# Patient Record
Sex: MALE | Race: BLACK OR AFRICAN AMERICAN
[De-identification: names, ages, dates, MRNs, and addresses within clinical notes are randomized per-mention and may not be internally consistent; named-entity substitution may affect disease eponyms.]

---

## 2023-02-28 ENCOUNTER — DASHBOARD ENCOUNTERS (OUTPATIENT)
Age: 33
End: 2023-02-28

## 2023-02-28 ENCOUNTER — WEB ENCOUNTER (OUTPATIENT)
Dept: URBAN - METROPOLITAN AREA CLINIC 92 | Facility: CLINIC | Age: 33
End: 2023-02-28

## 2023-02-28 ENCOUNTER — OFFICE VISIT (OUTPATIENT)
Dept: URBAN - METROPOLITAN AREA CLINIC 92 | Facility: CLINIC | Age: 33
End: 2023-02-28
Payer: COMMERCIAL

## 2023-02-28 VITALS
WEIGHT: 166.6 LBS | DIASTOLIC BLOOD PRESSURE: 81 MMHG | HEART RATE: 84 BPM | SYSTOLIC BLOOD PRESSURE: 129 MMHG | BODY MASS INDEX: 23.85 KG/M2 | HEIGHT: 70 IN | TEMPERATURE: 97.2 F

## 2023-02-28 DIAGNOSIS — R19.7 DIARRHEA, UNSPECIFIED TYPE: ICD-10-CM

## 2023-02-28 DIAGNOSIS — K62.5 RECTAL BLEEDING: ICD-10-CM

## 2023-02-28 PROCEDURE — 99203 OFFICE O/P NEW LOW 30 MIN: CPT

## 2023-02-28 NOTE — PHYSICAL EXAM GASTROINTESTINAL
Abdomen,  soft, lower abd tenderness, nondistended,  no guarding or rigidity,  no masses palpable,  normal bowel sounds Liver and Spleen,no hepatosplenomegaly

## 2023-03-02 ENCOUNTER — TELEPHONE ENCOUNTER (OUTPATIENT)
Dept: URBAN - METROPOLITAN AREA CLINIC 92 | Facility: CLINIC | Age: 33
End: 2023-03-02

## 2023-03-02 RX ORDER — CIPROFLOXACIN 500 MG/1
1 TABLET TABLET, FILM COATED ORAL TWICE A DAY
Qty: 6 TABLET | Refills: 0 | OUTPATIENT
Start: 2023-03-02 | End: 2023-03-05

## 2023-03-23 ENCOUNTER — OFFICE VISIT (OUTPATIENT)
Dept: URBAN - METROPOLITAN AREA CLINIC 92 | Facility: CLINIC | Age: 33
End: 2023-03-23

## 2024-05-23 NOTE — HPI-TODAY'S VISIT:
32-year-old male patient presents today with complaints of abdominal pain, diarrhea and blood in the stool for the past 3 days.  He was seen today at [facility] who obtained labs which were reviewed on his phone today.  CBC, CMP were negative.  No CT scan or stool studies were done Patient states that about 3 days ago he started noticing increased diarrhea along with blood.  He is having 3-4 bowel movements daily all loose.  Today he had a little bit more formed stool but this was still loose he has tried Imodium that does not work for symptoms. Abd pain is in the lower abd and is crampy in nature. No recent travel, sick contacts, medications or antibiotic use.  He states he is always had issues with his stomach in the past and typically has loose stools.  He states he has never noticed blood in his stool however.  He denies any rectal pain itching or burning.  No nausea, vomiting, fever, chills, weight loss.  No family history of any GI related cancers or IBD. Of note patient did have an episode of colitis due to Shigella back in 2020 which was treated and got better.  He is currently on Descovy for PrEP.
Is This A New Presentation, Or A Follow-Up?: Skin Lesions
How Severe Is Your Skin Lesion?: mild
Have Your Skin Lesions Been Treated?: not been treated